# Patient Record
Sex: MALE | Race: WHITE | NOT HISPANIC OR LATINO | URBAN - METROPOLITAN AREA
[De-identification: names, ages, dates, MRNs, and addresses within clinical notes are randomized per-mention and may not be internally consistent; named-entity substitution may affect disease eponyms.]

---
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
## 2021-01-22 NOTE — PROGRESS NOTE PEDS - PROBLEM/PLAN-1
DISPLAY PLAN FREE TEXT
DISPLAY PLAN FREE TEXT
Detail Level: Detailed
Hide Additional Notes?: No
DISPLAY PLAN FREE TEXT
Include Location In Plan?: Yes
Detail Level: Zone

## 2021-03-09 ENCOUNTER — OUTPATIENT (OUTPATIENT)
Dept: OUTPATIENT SERVICES | Facility: HOSPITAL | Age: 21
LOS: 1 days | End: 2021-03-09
Payer: COMMERCIAL

## 2021-03-09 VITALS
WEIGHT: 145.06 LBS | RESPIRATION RATE: 15 BRPM | SYSTOLIC BLOOD PRESSURE: 124 MMHG | TEMPERATURE: 98 F | OXYGEN SATURATION: 98 % | HEIGHT: 68 IN | HEART RATE: 80 BPM | DIASTOLIC BLOOD PRESSURE: 77 MMHG

## 2021-03-09 DIAGNOSIS — Z98.890 OTHER SPECIFIED POSTPROCEDURAL STATES: Chronic | ICD-10-CM

## 2021-03-09 DIAGNOSIS — S92.001A UNSPECIFIED FRACTURE OF RIGHT CALCANEUS, INITIAL ENCOUNTER FOR CLOSED FRACTURE: ICD-10-CM

## 2021-03-09 DIAGNOSIS — Z01.818 ENCOUNTER FOR OTHER PREPROCEDURAL EXAMINATION: ICD-10-CM

## 2021-03-09 DIAGNOSIS — Z20.828 CONTACT WITH AND (SUSPECTED) EXPOSURE TO OTHER VIRAL COMMUNICABLE DISEASES: ICD-10-CM

## 2021-03-09 LAB — SARS-COV-2 RNA SPEC QL NAA+PROBE: SIGNIFICANT CHANGE UP

## 2021-03-09 PROCEDURE — U0005: CPT

## 2021-03-09 PROCEDURE — U0003: CPT

## 2021-03-09 PROCEDURE — G0463: CPT

## 2021-03-09 NOTE — H&P PST ADULT - HISTORY OF PRESENT ILLNESS
this is a 21 y/o male who fell down stairs about 1 week ago and sustained right heel fracture; to have repair of same

## 2021-03-09 NOTE — H&P PST ADULT - NSANTHOSAYNRD_GEN_A_CORE
No. YESSENIA screening performed.  STOP BANG Legend: 0-2 = LOW Risk; 3-4 = INTERMEDIATE Risk; 5-8 = HIGH Risk

## 2021-03-09 NOTE — H&P PST ADULT - NSICDXPASTMEDICALHX_GEN_ALL_CORE_FT
PAST MEDICAL HISTORY:  No pertinent past medical history     Right calcaneal fracture     S/P sinus surgery

## 2021-03-09 NOTE — H&P PST ADULT - NSICDXPROBLEM_GEN_ALL_CORE_FT
PROBLEM DIAGNOSES  Problem: Fracture of right calcaneus  Assessment and Plan: right ORIF calcaneal fracture; covid test done today, preop instructions given

## 2021-03-10 ENCOUNTER — TRANSCRIPTION ENCOUNTER (OUTPATIENT)
Age: 21
End: 2021-03-10

## 2021-03-10 NOTE — ASU PATIENT PROFILE, ADULT - PMH
COVID-19  No taste/smell: throat soreness   Oct 2020  No pertinent past medical history    Right calcaneal fracture    S/P sinus surgery

## 2021-03-11 ENCOUNTER — OUTPATIENT (OUTPATIENT)
Dept: OUTPATIENT SERVICES | Facility: HOSPITAL | Age: 21
LOS: 1 days | End: 2021-03-11
Payer: COMMERCIAL

## 2021-03-11 VITALS
DIASTOLIC BLOOD PRESSURE: 67 MMHG | OXYGEN SATURATION: 100 % | WEIGHT: 133.6 LBS | TEMPERATURE: 98 F | HEART RATE: 72 BPM | HEIGHT: 65.5 IN | RESPIRATION RATE: 14 BRPM | SYSTOLIC BLOOD PRESSURE: 106 MMHG

## 2021-03-11 VITALS
HEART RATE: 74 BPM | OXYGEN SATURATION: 97 % | DIASTOLIC BLOOD PRESSURE: 64 MMHG | RESPIRATION RATE: 16 BRPM | SYSTOLIC BLOOD PRESSURE: 105 MMHG

## 2021-03-11 DIAGNOSIS — Z01.818 ENCOUNTER FOR OTHER PREPROCEDURAL EXAMINATION: ICD-10-CM

## 2021-03-11 DIAGNOSIS — Z98.890 OTHER SPECIFIED POSTPROCEDURAL STATES: Chronic | ICD-10-CM

## 2021-03-11 DIAGNOSIS — S92.061A DISPLACED INTRAARTICULAR FRACTURE OF RIGHT CALCANEUS, INITIAL ENCOUNTER FOR CLOSED FRACTURE: ICD-10-CM

## 2021-03-11 PROCEDURE — 28415 OPTX CALCANEAL FRACTURE: CPT | Mod: RT

## 2021-03-11 PROCEDURE — C1769: CPT

## 2021-03-11 PROCEDURE — 76000 FLUOROSCOPY <1 HR PHYS/QHP: CPT

## 2021-03-11 PROCEDURE — C1713: CPT

## 2021-03-11 PROCEDURE — 97161 PT EVAL LOW COMPLEX 20 MIN: CPT

## 2021-03-11 RX ORDER — HYDROMORPHONE HYDROCHLORIDE 2 MG/ML
0.5 INJECTION INTRAMUSCULAR; INTRAVENOUS; SUBCUTANEOUS
Refills: 0 | Status: DISCONTINUED | OUTPATIENT
Start: 2021-03-11 | End: 2021-03-11

## 2021-03-11 RX ORDER — ONDANSETRON 8 MG/1
4 TABLET, FILM COATED ORAL ONCE
Refills: 0 | Status: DISCONTINUED | OUTPATIENT
Start: 2021-03-11 | End: 2021-03-11

## 2021-03-11 RX ORDER — SODIUM CHLORIDE 9 MG/ML
1000 INJECTION, SOLUTION INTRAVENOUS
Refills: 0 | Status: DISCONTINUED | OUTPATIENT
Start: 2021-03-11 | End: 2021-03-11

## 2021-03-11 RX ORDER — ACETAMINOPHEN 500 MG
2 TABLET ORAL
Qty: 0 | Refills: 0 | DISCHARGE

## 2021-03-11 RX ORDER — ASPIRIN/CALCIUM CARB/MAGNESIUM 324 MG
1 TABLET ORAL
Qty: 30 | Refills: 0
Start: 2021-03-11 | End: 2021-04-09

## 2021-03-11 RX ORDER — CEFAZOLIN SODIUM 1 G
2000 VIAL (EA) INJECTION ONCE
Refills: 0 | Status: COMPLETED | OUTPATIENT
Start: 2021-03-11 | End: 2021-03-11

## 2021-03-11 RX ORDER — CHLORHEXIDINE GLUCONATE 213 G/1000ML
1 SOLUTION TOPICAL ONCE
Refills: 0 | Status: COMPLETED | OUTPATIENT
Start: 2021-03-11 | End: 2021-03-11

## 2021-03-11 RX ORDER — OXYCODONE HYDROCHLORIDE 5 MG/1
5 TABLET ORAL ONCE
Refills: 0 | Status: DISCONTINUED | OUTPATIENT
Start: 2021-03-11 | End: 2021-03-11

## 2021-03-11 RX ORDER — APREPITANT 80 MG/1
40 CAPSULE ORAL ONCE
Refills: 0 | Status: COMPLETED | OUTPATIENT
Start: 2021-03-11 | End: 2021-03-11

## 2021-03-11 RX ADMIN — CHLORHEXIDINE GLUCONATE 1 APPLICATION(S): 213 SOLUTION TOPICAL at 06:39

## 2021-03-11 RX ADMIN — APREPITANT 40 MILLIGRAM(S): 80 CAPSULE ORAL at 06:44

## 2021-03-11 RX ADMIN — SODIUM CHLORIDE 75 MILLILITER(S): 9 INJECTION, SOLUTION INTRAVENOUS at 10:56

## 2021-03-11 NOTE — ASU DISCHARGE PLAN (ADULT/PEDIATRIC) - ASU DC SPECIAL INSTRUCTIONSFT
Non weight bearing Right Lower extremity  Elevate right foot on blankets above heart level (toes above the nose) for 1 hour 3 times a day.  Apply ice pack to  foot for 30 minutes every 3 hours daily.  Keep bandage clean and dry daily.  Cover right foot in shower daily with plastic bag & tape ( or cast bag ) to keep dry.  Wear protective shoe on  foot daily  See the Surgeon in the office in 10 days.  Call the Surgeon for fever, severe foot pain.

## 2021-03-11 NOTE — ASU DISCHARGE PLAN (ADULT/PEDIATRIC) - CARE PROVIDER_API CALL
Tommy Mcallister)  Orthopaedic Surgery  [address] [place] [address]  Phone: [phone]  Fax: [phone]  Follow Up Time: 1 week

## 2021-03-11 NOTE — BRIEF OPERATIVE NOTE - NSICDXBRIEFPROCEDURE_GEN_ALL_CORE_FT
PROCEDURES:  Open reduction and internal fixation of calcaneus 11-Mar-2021 10:28:34 right Nikki Osorio

## 2021-03-12 ENCOUNTER — TRANSCRIPTION ENCOUNTER (OUTPATIENT)
Age: 21
End: 2021-03-12

## 2021-03-19 ENCOUNTER — TRANSCRIPTION ENCOUNTER (OUTPATIENT)
Age: 21
End: 2021-03-19

## 2022-06-09 PROBLEM — S92.001A UNSPECIFIED FRACTURE OF RIGHT CALCANEUS, INITIAL ENCOUNTER FOR CLOSED FRACTURE: Chronic | Status: ACTIVE | Noted: 2021-03-09

## 2022-06-09 PROBLEM — Z98.890 OTHER SPECIFIED POSTPROCEDURAL STATES: Chronic | Status: ACTIVE | Noted: 2021-03-09

## 2022-06-09 PROBLEM — U07.1 COVID-19: Chronic | Status: ACTIVE | Noted: 2021-03-11

## 2022-06-14 ENCOUNTER — APPOINTMENT (OUTPATIENT)
Dept: ORTHOPEDIC SURGERY | Facility: CLINIC | Age: 22
End: 2022-06-14
Payer: COMMERCIAL

## 2022-06-14 VITALS — BODY MASS INDEX: 23.49 KG/M2 | HEIGHT: 68 IN | WEIGHT: 155 LBS

## 2022-06-14 DIAGNOSIS — M72.2 PLANTAR FASCIAL FIBROMATOSIS: ICD-10-CM

## 2022-06-14 DIAGNOSIS — S92.061D DISPLACED INTRAARTICULAR FRACTURE OF RIGHT CALCANEUS, SUBSEQUENT ENCOUNTER FOR FRACTURE WITH ROUTINE HEALING: ICD-10-CM

## 2022-06-14 PROCEDURE — 99214 OFFICE O/P EST MOD 30 MIN: CPT

## 2022-06-14 PROCEDURE — 99213 OFFICE O/P EST LOW 20 MIN: CPT

## 2022-06-14 PROCEDURE — L1902: CPT

## 2022-06-14 PROCEDURE — 73650 X-RAY EXAM OF HEEL: CPT | Mod: RT

## 2022-06-14 NOTE — PHYSICAL EXAM
[Right] : right foot and ankle [NL (40)] : plantar flexion 40 degrees [NL 30)] : inversion 30 degrees [NL (20)] : eversion 20 degrees [5___] : eversion 5[unfilled]/5 [2+] : dorsalis pedis pulse: 2+ [] : patient ambulates without assistive device [[email or web address]] : mild ttp in area of screw heads

## 2022-06-14 NOTE — IMAGING
[Right] : right calcaneus [Weight -] : weightbearing [No loss of surgical correlation. Bony alignment acceptable. Hardware in appropriate position] : No loss of surgical correlation. Bony alignment acceptable. Hardware in appropriate position

## 2022-06-14 NOTE — HISTORY OF PRESENT ILLNESS
[5] : 5 [0] : 0 [Dull/Aching] : dull/aching [Constant] : constant [Rest] : rest [Standing] : standing [Walking] : walking [de-identified] : 3/7/21: fall 1 week ago at school w/ heel pain. no prior foot probs. went to ED and placed in splint. no dm/tob. student\par 3/10/21: CT f/up\par 3/11/21: ORIF R calcaneal fx\par 3/24/21: no complaints. nwb in splint\par 4/6/21: no complains. nwb in boot\par 5/10/21: pain improving. nwb in boot. c/o some tenderness about prominence at posterior heel\par 6/14/21: pain improving. stopped wearing boot and is now walking in reg shoes. going to PT. posterior prominence pain\par improved per patient\par 8/9/21: no sig pain. going to PT. has returned to working out in gym. walking in reg shoes\par 6/14/22: some pain w/ extended walking. no new injuries. no longer going to PT [] : no [FreeTextEntry1] : right foot

## 2022-06-14 NOTE — ASSESSMENT
[FreeTextEntry1] : wbat\par airheel\par ice/elevate\par nsaids prn\par PT\par discussed hardware removal - patient will consider\par f/up 4-6 weeks. \par \par The pros, cons, risks, benefits, and alternatives have been discussed.  The risks include but are not limited to infection, bleeding, injury to small nerves and blood vessels, pain, stiffness, progression, dvt, PE, amputation and death. Expected recovery time was discussed. All the patient's questions were answered and they would like to proceed.\par

## 2023-02-14 NOTE — ED PEDIATRIC NURSE NOTE - CARDIO WDL
Length To Time In Minutes Device Was In Place: 10 Normal rate, regular rhythm, normal S1, S2 heart sounds heard.

## 2024-01-01 NOTE — ED PEDIATRIC NURSE NOTE - BREATHING, MLM
Spontaneous, unlabored and symmetrical La Pitt  (NP)  2024 17:02:51 Maggie Meza  (RN)  2024 18:09:24

## 2024-01-09 ENCOUNTER — LABORATORY RESULT (OUTPATIENT)
Age: 24
End: 2024-01-09

## 2024-01-09 ENCOUNTER — APPOINTMENT (OUTPATIENT)
Dept: UROLOGY | Facility: CLINIC | Age: 24
End: 2024-01-09
Payer: COMMERCIAL

## 2024-01-09 VITALS
HEART RATE: 76 BPM | SYSTOLIC BLOOD PRESSURE: 102 MMHG | WEIGHT: 155 LBS | TEMPERATURE: 97.5 F | RESPIRATION RATE: 14 BRPM | OXYGEN SATURATION: 96 % | DIASTOLIC BLOOD PRESSURE: 64 MMHG | HEIGHT: 68 IN | BODY MASS INDEX: 23.49 KG/M2

## 2024-01-09 DIAGNOSIS — R36.9 URETHRAL DISCHARGE, UNSPECIFIED: ICD-10-CM

## 2024-01-09 DIAGNOSIS — R36.0 URETHRAL DISCHARGE W/OUT BLOOD: ICD-10-CM

## 2024-01-09 PROCEDURE — 99203 OFFICE O/P NEW LOW 30 MIN: CPT

## 2024-01-10 LAB
APPEARANCE: ABNORMAL
BACTERIA: NEGATIVE /HPF
BILIRUBIN URINE: NEGATIVE
BLOOD URINE: NEGATIVE
C TRACH RRNA SPEC QL NAA+PROBE: NOT DETECTED
CAST: 0 /LPF
COLOR: YELLOW
EPITHELIAL CELLS: 1 /HPF
GLUCOSE QUALITATIVE U: NEGATIVE MG/DL
HSV 1+2 IGG SER IA-IMP: NEGATIVE
HSV 1+2 IGG SER IA-IMP: NEGATIVE
HSV1 IGG SER QL: 0.07 INDEX
HSV2 IGG SER QL: 0.06 INDEX
HUMAN IMMUNODEFICIENCY VIRUS 1 (HIV-1) QUALITATIVE, RNA: NEGATIVE
KETONES URINE: NEGATIVE MG/DL
LEUKOCYTE ESTERASE URINE: ABNORMAL
MICROSCOPIC-UA: NORMAL
N GONORRHOEA RRNA SPEC QL NAA+PROBE: NOT DETECTED
NITRITE URINE: NEGATIVE
PH URINE: 8
PROTEIN URINE: NEGATIVE MG/DL
RED BLOOD CELLS URINE: 0 /HPF
SOURCE AMPLIFICATION: NORMAL
SPECIFIC GRAVITY URINE: 1.02
T PALLIDUM AB SER QL IA: NEGATIVE
UROBILINOGEN URINE: 0.2 MG/DL
WHITE BLOOD CELLS URINE: 1 /HPF

## 2024-01-11 LAB — BACTERIA UR CULT: NORMAL

## 2024-01-15 LAB
C TRACH IGA SER-ACNC: NORMAL
C TRACH IGG TITR SER: NORMAL
C TRACH IGM TITR SER: NORMAL
CHLAMYDIA TRACHOMATIS AB INTERPRETATION: NORMAL

## 2024-09-20 ENCOUNTER — APPOINTMENT (OUTPATIENT)
Dept: UROLOGY | Facility: CLINIC | Age: 24
End: 2024-09-20

## 2025-03-28 ENCOUNTER — APPOINTMENT (OUTPATIENT)
Dept: UROLOGY | Facility: CLINIC | Age: 25
End: 2025-03-28
Payer: COMMERCIAL

## 2025-03-28 ENCOUNTER — NON-APPOINTMENT (OUTPATIENT)
Age: 25
End: 2025-03-28

## 2025-03-28 ENCOUNTER — TRANSCRIPTION ENCOUNTER (OUTPATIENT)
Age: 25
End: 2025-03-28

## 2025-03-28 VITALS
DIASTOLIC BLOOD PRESSURE: 76 MMHG | TEMPERATURE: 97.2 F | WEIGHT: 161 LBS | HEART RATE: 75 BPM | SYSTOLIC BLOOD PRESSURE: 132 MMHG | OXYGEN SATURATION: 96 % | BODY MASS INDEX: 24.4 KG/M2 | HEIGHT: 68 IN

## 2025-03-28 DIAGNOSIS — Z78.9 OTHER SPECIFIED HEALTH STATUS: ICD-10-CM

## 2025-03-28 DIAGNOSIS — F17.200 NICOTINE DEPENDENCE, UNSPECIFIED, UNCOMPLICATED: ICD-10-CM

## 2025-03-28 DIAGNOSIS — A74.9 CHLAMYDIAL INFECTION, UNSPECIFIED: ICD-10-CM

## 2025-03-28 DIAGNOSIS — Z80.8 FAMILY HISTORY OF MALIGNANT NEOPLASM OF OTHER ORGANS OR SYSTEMS: ICD-10-CM

## 2025-03-28 DIAGNOSIS — N34.2 OTHER URETHRITIS: ICD-10-CM

## 2025-03-28 PROCEDURE — 51798 US URINE CAPACITY MEASURE: CPT

## 2025-03-28 PROCEDURE — 99213 OFFICE O/P EST LOW 20 MIN: CPT

## 2025-03-28 RX ORDER — DOXYCYCLINE HYCLATE 100 MG/1
100 TABLET ORAL
Qty: 42 | Refills: 0 | Status: ACTIVE | COMMUNITY
Start: 2025-03-28 | End: 1900-01-01

## 2025-03-28 RX ORDER — CHLORHEXIDINE GLUCONATE 4 %
LIQUID (ML) TOPICAL
Refills: 0 | Status: ACTIVE | COMMUNITY

## 2025-03-28 RX ORDER — CEFTRIAXONE 1 G/1
1 INJECTION, POWDER, FOR SOLUTION INTRAMUSCULAR; INTRAVENOUS
Qty: 1 | Refills: 0 | Status: ACTIVE | OUTPATIENT
Start: 2025-03-28

## 2025-03-28 RX ORDER — CEFTRIAXONE 1 G/1
1 INJECTION, POWDER, FOR SOLUTION INTRAMUSCULAR; INTRAVENOUS
Qty: 1 | Refills: 0 | Status: COMPLETED | OUTPATIENT
Start: 2025-03-28

## 2025-03-28 RX ADMIN — CEFTRIAXONE SODIUM 0 GM: 1 INJECTION, POWDER, FOR SOLUTION INTRAMUSCULAR; INTRAVENOUS at 00:00

## 2025-03-31 LAB
APPEARANCE: CLEAR
BACTERIA UR CULT: NORMAL
BACTERIA: NEGATIVE /HPF
BILIRUBIN URINE: NEGATIVE
BLOOD URINE: NEGATIVE
C TRACH RRNA SPEC QL NAA+PROBE: NOT DETECTED
CAST: 0 /LPF
COLOR: YELLOW
EPITHELIAL CELLS: 0 /HPF
GLUCOSE QUALITATIVE U: NEGATIVE MG/DL
HIV1+2 AB SPEC QL IA.RAPID: NONREACTIVE
HSV 1+2 IGG SER IA-IMP: NEGATIVE
HSV 1+2 IGG SER IA-IMP: NEGATIVE
HSV1 IGG SER QL: <0.01 INDEX
HSV2 IGG SER QL: 0.07 INDEX
KETONES URINE: NEGATIVE MG/DL
LEUKOCYTE ESTERASE URINE: NEGATIVE
MICROSCOPIC-UA: NORMAL
N GONORRHOEA RRNA SPEC QL NAA+PROBE: NOT DETECTED
NITRITE URINE: NEGATIVE
PH URINE: 6
PROTEIN URINE: NEGATIVE MG/DL
RED BLOOD CELLS URINE: 2 /HPF
SOURCE AMPLIFICATION: NORMAL
SOURCE AMPLIFICATION: NORMAL
SPECIFIC GRAVITY URINE: 1.02
T PALLIDUM AB SER QL IA: NEGATIVE
T VAGINALIS RRNA SPEC QL NAA+PROBE: NOT DETECTED
UROBILINOGEN URINE: 0.2 MG/DL
WHITE BLOOD CELLS URINE: 0 /HPF

## 2025-04-04 LAB
MYCOPLASMA HOMINIS CULTURE: NEGATIVE
UREAPLASMA CULTURE: NEGATIVE

## 2025-04-10 ENCOUNTER — NON-APPOINTMENT (OUTPATIENT)
Age: 25
End: 2025-04-10

## 2025-07-07 NOTE — ED PEDIATRIC NURSE NOTE - EENT WDL
Name from pharmacy: BUPROPION HCL  MG TABLET         Will file in chart as: buPROPion XL (WELLBUTRIN XL) 150 MG 24 hr tablet    Sig: TAKE 1 TABLET BY MOUTH EVERY DAY    Disp: 90 tablet    Refills: 1    Start: 7/6/2025    Class: Eprescribe    Non-formulary For: Anxiety and depression    Last ordered: 3 weeks ago (6/13/2025) by ADOLFO Torres    Last refill: 6/13/2025    Rx #: [id number]    Pharmacy comment: REQUEST FOR 90 DAYS PRESCRIPTION. DX Code Needed.    Atypical Antidepressants Refill Protocol - 12 Month Protocol [id number]/2025 08:30 AM   Protocol Details Seen by prescribing provider or same department within the last 12 months or has a future appt in 3 months - IF FAILED PLEASE LOOK AT CHART REVIEW FOR LAST VISIT AND PROCEED ACCORDINGLY    Patient is not pregnant    Medication (including dose and sig) on current meds list      This request has changes from the previous prescription.   To be filled at: [facility] #[id number] - [place], [place] - [address]     Medication passed protocol.     Medication: Wellbutrin passed protocol.   Last office visit date: 6/13/25  Next appointment scheduled?: Yes - 7/17/25   Medication last refilled 6/13/25 #30 with 1 refill    No refill needed at this time. Further refills provided at upcoming appt  
Eyes with no visual disturbances.  Ears clean and dry and no hearing difficulties. Nose with pink mucosa and no drainage.  Mouth mucous membranes moist and pink.  No tenderness or swelling to throat or neck.

## 2025-08-08 ENCOUNTER — APPOINTMENT (OUTPATIENT)
Dept: UROLOGY | Facility: CLINIC | Age: 25
End: 2025-08-08
Payer: COMMERCIAL

## 2025-08-08 VITALS
HEART RATE: 72 BPM | BODY MASS INDEX: 24.71 KG/M2 | SYSTOLIC BLOOD PRESSURE: 103 MMHG | HEIGHT: 68 IN | OXYGEN SATURATION: 96 % | WEIGHT: 163 LBS | DIASTOLIC BLOOD PRESSURE: 65 MMHG

## 2025-08-08 DIAGNOSIS — N34.2 OTHER URETHRITIS: ICD-10-CM

## 2025-08-08 PROCEDURE — 99213 OFFICE O/P EST LOW 20 MIN: CPT

## 2025-08-08 RX ORDER — DOXYCYCLINE HYCLATE 100 MG/1
100 TABLET, COATED ORAL
Qty: 28 | Refills: 0 | Status: ACTIVE | COMMUNITY
Start: 2025-08-08 | End: 1900-01-01

## 2025-08-08 RX ORDER — CEFTRIAXONE 1 G/1
1 INJECTION, POWDER, FOR SOLUTION INTRAMUSCULAR; INTRAVENOUS
Qty: 1 | Refills: 0 | Status: COMPLETED | OUTPATIENT
Start: 2025-08-08

## 2025-08-08 RX ORDER — CEFTRIAXONE 1 G/1
1 INJECTION, POWDER, FOR SOLUTION INTRAMUSCULAR; INTRAVENOUS
Qty: 1 | Refills: 0 | Status: ACTIVE | OUTPATIENT
Start: 2025-08-08

## 2025-08-08 RX ADMIN — CEFTRIAXONE SODIUM 0 GM: 1 INJECTION, POWDER, FOR SOLUTION INTRAMUSCULAR; INTRAVENOUS at 00:00

## 2025-08-12 LAB
APPEARANCE: CLEAR
BACTERIA UR CULT: NORMAL
BACTERIA: NEGATIVE /HPF
BILIRUBIN URINE: NEGATIVE
BLOOD URINE: NEGATIVE
C TRACH RRNA SPEC QL NAA+PROBE: NOT DETECTED
CAST: 0 /LPF
COLOR: YELLOW
EPITHELIAL CELLS: 1 /HPF
GLUCOSE QUALITATIVE U: NEGATIVE MG/DL
KETONES URINE: NEGATIVE MG/DL
LEUKOCYTE ESTERASE URINE: NEGATIVE
MICROSCOPIC-UA: NORMAL
N GONORRHOEA RRNA SPEC QL NAA+PROBE: NOT DETECTED
NITRITE URINE: NEGATIVE
PH URINE: 6
PROTEIN URINE: NEGATIVE MG/DL
RED BLOOD CELLS URINE: 1 /HPF
SOURCE AMPLIFICATION: NORMAL
SOURCE AMPLIFICATION: NORMAL
SPECIFIC GRAVITY URINE: 1.02
T VAGINALIS RRNA SPEC QL NAA+PROBE: NOT DETECTED
UROBILINOGEN URINE: 0.2 MG/DL
WHITE BLOOD CELLS URINE: 0 /HPF

## 2025-08-18 LAB
MYCOPLASMA HOMINIS CULTURE: NEGATIVE
UREAPLASMA CULTURE: NEGATIVE